# Patient Record
Sex: FEMALE | Race: WHITE | NOT HISPANIC OR LATINO | Employment: FULL TIME | URBAN - METROPOLITAN AREA
[De-identification: names, ages, dates, MRNs, and addresses within clinical notes are randomized per-mention and may not be internally consistent; named-entity substitution may affect disease eponyms.]

---

## 2017-05-22 ENCOUNTER — EXTERNAL ORDER RESULTS (OUTPATIENT)
Dept: HEALTH INFORMATION MANAGEMENT | Facility: CLINIC | Age: 47
End: 2017-05-22

## 2017-05-22 LAB — PAP SMEAR - HIM PATIENT REPORTED: NEGATIVE

## 2017-07-10 ENCOUNTER — HOSPITAL ENCOUNTER (OUTPATIENT)
Dept: MAMMOGRAPHY | Facility: HOSPITAL | Age: 47
Discharge: HOME | End: 2017-07-10
Attending: OBSTETRICS & GYNECOLOGY

## 2017-07-10 ENCOUNTER — TRANSFERRED RECORDS (OUTPATIENT)
Dept: HEALTH INFORMATION MANAGEMENT | Facility: CLINIC | Age: 47
End: 2017-07-10

## 2017-07-10 DIAGNOSIS — Z12.31 VISIT FOR SCREENING MAMMOGRAM: ICD-10-CM

## 2017-07-21 ENCOUNTER — OFFICE VISIT (OUTPATIENT)
Dept: FAMILY MEDICINE | Facility: CLINIC | Age: 47
End: 2017-07-21
Payer: COMMERCIAL

## 2017-07-21 VITALS
TEMPERATURE: 98.2 F | DIASTOLIC BLOOD PRESSURE: 74 MMHG | HEIGHT: 64 IN | SYSTOLIC BLOOD PRESSURE: 128 MMHG | BODY MASS INDEX: 21.75 KG/M2 | WEIGHT: 127.4 LBS | HEART RATE: 69 BPM

## 2017-07-21 DIAGNOSIS — R09.A2 GLOBUS SENSATION: ICD-10-CM

## 2017-07-21 DIAGNOSIS — J04.0 LARYNGITIS: Primary | ICD-10-CM

## 2017-07-21 PROCEDURE — 99214 OFFICE O/P EST MOD 30 MIN: CPT | Performed by: FAMILY MEDICINE

## 2017-07-21 PROCEDURE — 36415 COLL VENOUS BLD VENIPUNCTURE: CPT | Performed by: FAMILY MEDICINE

## 2017-07-21 PROCEDURE — 84443 ASSAY THYROID STIM HORMONE: CPT | Performed by: FAMILY MEDICINE

## 2017-07-21 NOTE — PATIENT INSTRUCTIONS
Ear, nose & throat consult.    TSH (thryoid test) today.    If all normal, will get an ultrasound of neck to make sure no nodules or issues.

## 2017-07-21 NOTE — NURSING NOTE
"Chief Complaint   Patient presents with     Throat Problem       Initial /74  Pulse 69  Temp 98.2  F (36.8  C) (Tympanic)  Ht 5' 4\" (1.626 m)  Wt 127 lb 6.4 oz (57.8 kg)  LMP 07/15/2017 (Approximate)  Breastfeeding? No  BMI 21.87 kg/m2 Estimated body mass index is 21.87 kg/(m^2) as calculated from the following:    Height as of this encounter: 5' 4\" (1.626 m).    Weight as of this encounter: 127 lb 6.4 oz (57.8 kg).  Medication Reconciliation: complete     Corina Cuba CMA      "

## 2017-07-21 NOTE — LETTER
Suzan Toussaint  [address] [facility] [address] [address]        July 27, 2017          Dear ,    We are writing to inform you of your test results.    Your test results fall within the expected range(s) or remain unchanged from previous results.  Your TSH indicates that your thyroid function is currently in balance.  No additional testing is necessary for a year or unless you develop symptoms of over or underactive thyroid.    Resulted Orders   TSH   Result Value Ref Range    TSH 1.02 0.40 - 4.00 mU/L       If you have any questions or concerns, please call the clinic at the number listed above. Thank You.      Sincerely,        Marino Lugo MD

## 2017-07-21 NOTE — MR AVS SNAPSHOT
After Visit Summary   7/21/2017    Suzan Toussaint    MRN: [id number]           Patient Information     Date Of Birth          1970        Visit Information        Provider Department      7/21/2017 2:00 PM Marino Lguo MD [facility]        Today's Diagnoses     Laryngitis    -  1    Globus sensation          Care Instructions      Ear, nose & throat consult.    TSH (thryoid test) today.    If all normal, will get an ultrasound of neck to make sure no nodules or issues.           Follow-ups after your visit        Additional Services     OTOLARYNGOLOGY REFERRAL       Your provider has referred you to: [facility]: [facility] [phone]  [email or web address]    Please be aware that coverage of these services is subject to the terms and limitations of your health insurance plan.  Call member services at your health plan with any benefit or coverage questions.      Please bring the following with you to your appointment:    (1) Any X-Rays, CTs or MRIs which have been performed.  Contact the facility where they were done to arrange for  prior to your scheduled appointment.   (2) List of current medications  (3) This referral request   (4) Any documents/labs given to you for this referral                  Future tests that were ordered for you today     Open Future Orders        Priority Expected Expires Ordered    US Head Neck Soft Tissue Routine  7/21/2018 7/21/2017            Who to contact     Normal or non-critical lab and imaging results will be communicated to you by MyChart, letter or phone within 4 business days after the clinic has received the results. If you do not hear from us within 7 days, please contact the clinic through MyChart or phone. If you have a critical or abnormal lab result, we will notify you by phone as soon as possible.  Submit refill requests through [employer] or call your pharmacy and they will forward  "the refill request to us. Please allow 3 business days for your refill to be completed.          If you need to speak with a  for additional information , please call: [phone]           Additional Information About Your Visit        [employer] Information     [employer] lets you send messages to your doctor, view your test results, renew your prescriptions, schedule appointments and more. To sign up, go to [email or web address] . Click on \"Log in\" on the left side of the screen, which will take you to the Welcome page. Then click on \"Sign up Now\" on the right side of the page.     You will be asked to enter the access code listed below, as well as some personal information. Please follow the directions to create your username and password.     Your access code is: [email or web address]  Expires: 10/19/2017  2:23 PM     Your access code will  in 90 days. If you need help or a new code, please call your [place] clinic or [phone].        Care EveryWhere ID     This is your Care EveryWhere ID. This could be used by other organizations to access your [place] medical records  [id number]        Your Vitals Were     Pulse Temperature Height Last Period Breastfeeding? BMI (Body Mass Index)    69 98.2  F (36.8  C) (Tympanic) 5' 4\" (1.626 m) 07/15/2017 (Approximate) No 21.87 kg/m2       Blood Pressure from Last 3 Encounters:   17 128/74   10/06/14 130/78   10/29/13 110/72    Weight from Last 3 Encounters:   17 127 lb 6.4 oz (57.8 kg)   10/06/14 123 lb (55.8 kg)   10/29/13 122 lb 8 oz (55.6 kg)              We Performed the Following     OTOLARYNGOLOGY REFERRAL     TSH        Primary Care Provider Office Phone # Fax #    Merle Riojas [phone]       [facility] [facility] [address] [place] [address]        Equal Access to Services     GOVIND LEE AH: Hadii layne Ansari, wacarynda lukrista, qasangeetha magana" lareji hall. So [facility] [phone].    ATENCIÓN: Si britneyla jaun, tiene a andersen disposición servicios gratuitos de asistencia lingüística. Allison wilkinson [phone].    We comply with applicable federal civil rights laws and [place] laws. We do not discriminate on the basis of race, color, national origin, age, disability sex, sexual orientation or gender identity.            Thank you!     Thank you for choosing [facility]  for your care. Our goal is always to provide you with excellent care. Hearing back from our patients is one way we can continue to improve our services. Please take a few minutes to complete the written survey that you may receive in the mail after your visit with us. Thank you!             Your Updated Medication List - Protect others around you: Learn how to safely use, store and throw away your medicines at www.disposemymeds.org.          This list is accurate as of: 7/21/17  2:23 PM.  Always use your most recent med list.                   Brand Name Dispense Instructions for use Diagnosis    diazepam 5 MG tablet    VALIUM    20 tablet    Take 1 tablet (5 mg) by mouth every 8 hours as needed for anxiety or sleep    Benign paroxysmal positional vertigo, left       * NO ACTIVE MEDICATIONS      .    Superficial injury of cornea       ondansetron 8 MG ODT tab    ZOFRAN ODT    20 tablet    Take 1 tablet (8 mg) by mouth 3 times daily (before meals)    Benign paroxysmal positional vertigo, left       * order for DME     2 Device    Equipment being ordered: BILAT WRIST BRACE WITH THE THUMB    De Quervain's disease (tenosynovitis)       * Notice:  This list has 2 medication(s) that are the same as other medications prescribed for you. Read the directions carefully, and ask your doctor or other care provider to review them with you.

## 2017-07-21 NOTE — PROGRESS NOTES
"  SUBJECTIVE:                                                    Suzan Toussaint is a 47 year old female who presents to clinic today for the following health issues:      Concern - Throat Problem   Onset: Mid may     Description:   Pt feels like there is lump when she swallows. Not painful. Does not interfere with eating. A lot of throat clearing.     Intensity: mild    Progression of Symptoms:  constant    Accompanying Signs & Symptoms:  None     Previous history of similar problem:   No     Precipitating factors:   Worsened by: No     Alleviating factors:  Improved by: No     Therapies Tried and outcome: Dentist looked in throat could find nothing.       PROBLEMS TO ADD ON...  Occassional vertigo. Has done treatment in past.   Tired a lot.  Doesn't sleep well ever.       Problem list and histories reviewed & adjusted, as indicated.  Additional history: as documented        Reviewed and updated as needed this visit by clinical staffTobacco  Allergies  Meds  Med Hx  Surg Hx  Fam Hx  Soc Hx      Reviewed and updated as needed this visit by Provider         1. Laryngitis    2. Globus sensation        PMH: Updated and/or reviewed in chart.    PSH: Updated and/or reviewed in chart.    Family History: Updated and/or reviewed in chart.     ROS:  Constitutional, neuro, EMT, endocrine, pulmonary, cardiac, gastrointestinal, genitourinary, musculoskeletal, integument and psychiatric systems are otherwise negative.    OBJECTIVE:                                                    /74  Pulse 69  Temp 98.2  F (36.8  C) (Tympanic)  Ht 5' 4\" (1.626 m)  Wt 127 lb 6.4 oz (57.8 kg)  LMP 07/15/2017 (Approximate)  Breastfeeding? No  BMI 21.87 kg/m2  GENERAL: Pleasant and interactive.  Alert and oriented x 3.  No acute distress.  HEENT: Normocephalic, atraumatic. PEERRLA, EOMI.  Scleras, lids and conjunctivae normal. Pinnas, canals and TM's clear.  Nose and oropharynx moist and clear.  NECK: supple and free of " adenopathy or masses, the thyroid is normal without enlargement or nodules.  CHEST:  clear, no wheezing or rales. Normal symmetric air entry throughout both lung fields. No chest wall deformities or tenderness.  HEART:  S1 and S2 normal, no murmurs, clicks, gallops or rubs. Regular rate and rhythm.  ABDOMEN: Soft without tenderness, guarding, mass, rebound or organomegaly. Bowel sounds are normal. No CVA tenderness or inguinal adenopathy noted.    Results for orders placed or performed in visit on 02/04/11   X-ray lt toe(s) G/E 2 views    Impression       TOES LEFT TWO OR MORE VIEWS February 4, 2011 at 1002 hours     HISTORY: A 40-year-old woman with left toe pain.     COMPARISON: None.     FINDINGS: Negative.   LIPID REFLEX TO DIRECT LDL PANEL   Result Value Ref Range    Cholesterol 170 0 - 200 mg/dL    Triglycerides 52 0 - 150 mg/dL    HDL Cholesterol 69 50 - 110 mg/dL    LDL Cholesterol Calculated 90 0 - 129 mg/dL    VLDL-Cholesterol 10 0 - 30 mg/dL    Cholesterol/HDL Ratio 2.0 0.0 - 5.0   Vitamin D deficiency screening   Result Value Ref Range    25 OH Vit D2 <5 ug/L    25 OH Vit D3 44 ug/L    25 OH Vit D total  30 - 75 ug/L     <49  Season, race, dietary intake, and treatment affect the concentration of   25-hydroxy-Vitamin D. Values may decrease during winter months and increase   during summer months. Values less than 30 ug/L may indicate Vitamin D   deficiency.   Comprehensive metabolic panel   Result Value Ref Range    Sodium 138 133 - 144 mmol/L    Potassium 4.4 3.4 - 5.3 mmol/L    Chloride 104 94 - 109 mmol/L    Carbon Dioxide 27 20 - 32 mmol/L    Anion Gap 7 6 - 17 mmol/L    Glucose 83 60 - 99 mg/dL    Urea Nitrogen 17 5 - 24 mg/dL    Creatinine 0.90 0.52 - 1.04 mg/dL    GFR Estimate 70 >60 mL/min/1.7m2    GFR Estimate If Black 84 >60 mL/min/1.7m2    Calcium 9.4 8.5 - 10.4 mg/dL    Bilirubin Total 0.5 0.2 - 1.3 mg/dL    Albumin 4.6 3.9 - 5.1 g/dL    Protein Total 7.6 6.8 - 8.8 g/dL    Alkaline  Phosphatase 39 (L) 40 - 150 U/L    ALT 28 0 - 50 U/L    AST 26 0 - 45 U/L   CBC with platelets   Result Value Ref Range    WBC 4.9 4.0 - 11.0 10e9/L    RBC Count 4.28 3.8 - 5.2 10e12/L    Hemoglobin 13.4 11.7 - 15.7 g/dL    Hematocrit 39.2 35.0 - 47.0 %    MCV 92 78 - 100 fl    MCH 31.3 26.5 - 33.0 pg    MCHC 34.2 31.5 - 36.5 g/dL    RDW 12.3 10.0 - 15.0 %    Platelet Count 180 150 - 450 10e9/L   TSH with free T4 reflex   Result Value Ref Range    TSH 1.55 0.4 - 5.0 mU/L      ASSESSMENT/PLAN:                                                        ICD-10-CM    1. Laryngitis J04.0 TSH     OTOLARYNGOLOGY REFERRAL     US Head Neck Soft Tissue   2. Globus sensation F45.8 TSH     OTOLARYNGOLOGY REFERRAL     US Head Neck Soft Tissue       Care plan updated in chart for chronic problems.    Patient Instructions     Ear, nose & throat consult.    TSH (thryoid test) today.    If all normal, will get an ultrasound of neck to make sure no nodules or issues.      Orders Placed This Encounter     US Head Neck Soft Tissue     TSH     OTOLARYNGOLOGY REFERRAL          See Patient Instructions    Marino Lugo MD

## 2017-07-22 LAB — TSH SERPL DL<=0.05 MIU/L-ACNC: 1.02 MU/L (ref 0.4–4)

## 2017-07-25 ENCOUNTER — OFFICE VISIT (OUTPATIENT)
Dept: OTOLARYNGOLOGY | Facility: CLINIC | Age: 47
End: 2017-07-25
Payer: COMMERCIAL

## 2017-07-25 VITALS — HEIGHT: 64 IN | BODY MASS INDEX: 21.68 KG/M2 | RESPIRATION RATE: 16 BRPM | WEIGHT: 127 LBS

## 2017-07-25 DIAGNOSIS — R09.A2 GLOBUS SENSATION: Primary | ICD-10-CM

## 2017-07-25 DIAGNOSIS — H61.23 BILATERAL IMPACTED CERUMEN: ICD-10-CM

## 2017-07-25 DIAGNOSIS — R49.0 HOARSENESS: ICD-10-CM

## 2017-07-25 PROCEDURE — 69210 REMOVE IMPACTED EAR WAX UNI: CPT | Performed by: OTOLARYNGOLOGY

## 2017-07-25 PROCEDURE — 31575 DIAGNOSTIC LARYNGOSCOPY: CPT | Performed by: OTOLARYNGOLOGY

## 2017-07-25 PROCEDURE — 99204 OFFICE O/P NEW MOD 45 MIN: CPT | Mod: 25 | Performed by: OTOLARYNGOLOGY

## 2017-07-25 ASSESSMENT — PAIN SCALES - GENERAL: PAINLEVEL: NO PAIN (0)

## 2017-07-25 NOTE — Clinical Note
Hi Dr. Lugo,  I didn't see anything on nasopharyngoscopy. I think the thyroid U/S is a good idea. Can you please forward me the results? I'll call her with them.   Phong

## 2017-07-25 NOTE — PATIENT INSTRUCTIONS
General Scheduling Information  To schedule your CT/MRI scan, please contact [place] [facility] at [phone]   [address] [place] [address]    To schedule your Surgery, please contact our Specialty Schedulers at [phone]    ENT Clinic Locations Clinic Hours Telephone Number     [facility]  [address] [place] [address]   Tuesday:       8:00am -- 4:00pm    Wednesday:  8:00am - 4:00pm   To schedule an appointment with   Dr. Fleming,   please contact our   Specialty Scheduling Department at:     [phone]       [facility]  [address] [place] [address]   Friday:          8:00am - 4:00pm         Urgent Care Locations Clinic Hours Telephone Numbers     [facility]  [address] [place] [address]     Monday-Friday:     11:00pm - 9:00pm    Saturday-Sunday:  9:00am - 5:00pm   [phone]     [facility]  [address] [place] [address]     Monday-Friday:      5:00pm - 9:00pm     Saturday-Sunday:  9:00am - 5:00pm   [phone]

## 2017-07-25 NOTE — PROGRESS NOTES
Chief Complaint - foreign body sensation in throat    History of Present Illness - Suzan Toussaint is a 47 year old female who presents with a history of feeling like something is in the back of the throat since a couple months ago. It isn't as bad in the morning. Worse as the day goes on. Talks a lot. She points to the left low anterior neck. Swallowing is when she feels it, but no dysphagia. They also note frequent throat clearing. Voicing has seemed hoarse. The patient denies any recent intubations or throat procedures. They note no history of relux. She tried allergy meds, no help. No hemoptysis, odynaphagia. No dysphagia with solids. Some cough. TSH 1.02.     Past Medical History -   Patient Active Problem List   Diagnosis     CARDIOVASCULAR SCREENING; LDL GOAL LESS THAN 160     Toe pain, left       Current Medications -   Current Outpatient Prescriptions:      diazepam (VALIUM) 5 MG tablet, Take 1 tablet (5 mg) by mouth every 8 hours as needed for anxiety or sleep, Disp: 20 tablet, Rfl: 0     ondansetron (ZOFRAN ODT) 8 MG disintegrating tablet, Take 1 tablet (8 mg) by mouth 3 times daily (before meals), Disp: 20 tablet, Rfl: 0     ORDER FOR DME, Equipment being ordered: BILAT WRIST BRACE WITH THE THUMB, Disp: 2 Device, Rfl: 0     NO ACTIVE MEDICATIONS, ., Disp: , Rfl:     Allergies - No Known Allergies    Social History -   Social History     Social History     Marital status:      Spouse name: N/A     Number of children: N/A     Years of education: N/A     Social History Main Topics     Smoking status: Never Smoker     Smokeless tobacco: Never Used     Alcohol use Yes      Comment: rare     Drug use: No     Sexual activity: Yes     Partners: Male      Comment:  with a vasectomy     Other Topics Concern     None     Social History Narrative       Family History - grandma may have had thyroid cancer.     Review of Systems - As per HPI and PMHx, + vertigo, positional, + ear wax both ears,  "otherwise 10+ comprehensive system review is negative.    Physical Exam  Resp 16  Ht 1.626 m (5' 4\")  Wt 57.6 kg (127 lb)  LMP 07/15/2017 (Approximate)  BMI 21.8 kg/m2  General - The patient is in no distress. Alert and oriented to person and place, answers questions and cooperates with examination appropriately.   Voice and Breathing - The patient was breathing comfortably without the use of accessory muscles. There was no wheezing, stridor, or stertor.  The patients voice was clear and strong.  Eyes - Extraocular movements intact.  Sclera were not icteric or injected, conjunctiva were pink and moist.  Mouth - Examination of the oral cavity showed pink, healthy oral mucosa. No lesions or ulcerations noted.  The tongue was mobile and midline.  Throat - The walls of the oropharynx were smooth, symmetric, and had no lesions or ulcerations.  The tonsillar pillars and soft palate were symmetric.  The uvula was midline on elevation. Tonsils endophytic and quiet. No stones.  Nose - External contour is symmetric, no gross deflection or scars.  Nasal mucosa is pink and moist with no abnormal mucus.  The septum was deviated some to the left, turbinates of normal size and position.  No polyps, masses, or purulence noted on examination.  Neck -  Palpation of the occipital, submental, submandibular, internal jugular chain, and supraclavicular nodes did not demonstrate any abnormal lymph nodes or masses. No parotid masses. Palpation of the thyroid was soft and smooth, with no nodules or goiter appreciated.  The trachea was mobile and midline.  Neurologic - CN II-XII are grossly intact, no focal neurologic deficits.   Cardiovascular - carotid pulses are 2+ bilaterally, regular rhythm    Procedure: Flexible Endoscopy  Indication: Globus Sensation    To best visualize the upper airway anatomy and due to the chief complaint and HPI, I proceeded with a fiberoptic examination. Color photographs were taken for the medical record. " First I sprayed the right side of the nose with a mixture of lidocaine and neosynephrine.  I then passed the scope through the nasal cavity.  The nasal cavity was unremarkable.  The nasopharynx was mucosally covered and symmetric.  The Eustachian tube openings were unobstructed.  Going further down I had a clear view of the base of tongue which had normal appearing lingual tonsillar tissue.  The base of tongue was free of lesions, masses, and the vallecula was open.  The epiglottis was smooth and mucosally covered.  The supraglottic larynx was then clearly visualized. It was normal. The vocal cords moved smoothly and symmetrically, they were pearly white and no lesions were seen.  The pyriform sinuses were open, and the limited view of the postcricoid region did not show any lesions.    Cerumen Removal    Physical Exam and Procedure  Ears - On examination of the ears, I found that both ears were impacted with cerumen.  Therefore, I positioned the patient in the examination chair in a semi-supine position. I used the binocular surgical microscope to perform cerumen removal.  On the right side, I began by using a cerumen loop to gently lift the edges of the cerumen mass away from the walls of the external canal.  Once I did this, I was able to pull with an alligator and suction away fragments of wax and debris. I removed all the wax and debri.  The tympanic membrane was intact, no sign of perforation or middle ear effusion.    I turned my attention to the left side once again using the binocular surgical microscope to perform cerumen removal.  I began by using a cerumen loop to gently lift the edges of the cerumen mass away from the walls of the external canal.  Once I did this, I was able to pull and then also suction away fragments of wax and debris. I removed all the wax and debri. I had to pull out larger pieces with an alligator. The tympanic membrane was intact, no sign of perforation or middle ear  effusion.          A/P - Suzan Toussaint is a 47 year old female with globus sensation but no evidence of infection, inflammation, or neoplasm on exam to explain the symptoms. It is focal, left side. I don't feel anything, but I do think a thyroid U/S is reasonable given she points to the left lobe. This could also be globus from reflux, allergies, postnasal drainage. She can try flonase, and pay closer attention to reflux. I gave her a handout on some diet and lifestyle changes. No eating before lying down. Recheck in 4-6 weeks.         Dr. Jaret Fleming  Otolaryngology  [facility]

## 2017-07-25 NOTE — MR AVS SNAPSHOT
After Visit Summary   7/25/2017    Suzan Toussaint    MRN: [id number]           Patient Information     Date Of Birth          1970        Visit Information        Provider Department      7/25/2017 8:15 AM Jaret Fleming MD [facility]        Today's Diagnoses     Globus sensation    -  1    Hoarseness        Bilateral impacted cerumen          Care Instructions    General Scheduling Information  To schedule your CT/MRI scan, please contact [place] [facility] at [phone] [address] [place] [address]    To schedule your Surgery, please contact our Specialty Schedulers at [phone]    ENT Clinic Locations Clinic Hours Telephone Number     [facility]  [address]  [place] [address]   Tuesday:       8:00am -- 4:00pm    Wednesday:  8:00am - 4:00pm   To schedule an appointment with   Dr. Fleming,   please contact our   Specialty Scheduling Department at:     [phone]       [facility]  [address] [place] [address]   Friday:          8:00am - 4:00pm         Urgent Care Locations Clinic Hours Telephone Numbers     [facility]  [address] [place] [address]     Monday-Friday:     11:00pm - 9:00pm    Saturday-Sunday:  9:00am - 5:00pm   [phone]     [facility]  [address] [place] [address]     Monday-Friday:      5:00pm - 9:00pm     Saturday-Sunday:  9:00am - 5:00pm   [phone]               Follow-ups after your visit        Who to contact     If you have questions or need follow up information about today's clinic visit or your schedule please contact [facility] directly at [phone].  Normal or non-critical lab and imaging results will be communicated to you by MyChart, letter or phone within 4 business days after the clinic has received the results. If you do not hear from us within 7 days, please contact the clinic through MyChart or phone. If you have a critical  "or abnormal lab result, we will notify you by phone as soon as possible.  Submit refill requests through [employer] or call your pharmacy and they will forward the refill request to us. Please allow 3 business days for your refill to be completed.          Additional Information About Your Visit        [employer] Information     [employer] lets you send messages to your doctor, view your test results, renew your prescriptions, schedule appointments and more. To sign up, go to [email or web address] . Click on \"Log in\" on the left side of the screen, which will take you to the Welcome page. Then click on \"Sign up Now\" on the right side of the page.     You will be asked to enter the access code listed below, as well as some personal information. Please follow the directions to create your username and password.     Your access code is: [email or web address]  Expires: 10/19/2017  2:23 PM     Your access code will  in 90 days. If you need help or a new code, please call your [facility] clinic or [phone].        Care EveryWhere ID     This is your Care EveryWhere ID. This could be used by other organizations to access your [facility] medical records  [id number]        Your Vitals Were     Respirations Height Last Period BMI (Body Mass Index)          16 1.626 m (5' 4\") 07/15/2017 (Approximate) 21.8 kg/m2         Blood Pressure from Last 3 Encounters:   17 128/74   10/06/14 130/78   10/29/13 110/72    Weight from Last 3 Encounters:   17 57.6 kg (127 lb)   17 57.8 kg (127 lb 6.4 oz)   10/06/14 55.8 kg (123 lb)              We Performed the Following     Laryngoscopy, Fiber     Remove Cerumen        Primary Care Provider Office Phone # Fax #    Merle Riojas [phone]       [place] [facility] [address] [place] [address]        Equal Access to Services     MILAGROS LEE AH: Belinda Ansari, jeremy calle, qasayda obrien, sangeetha jenkins" lareji hall. So [facility] [phone].    ATENCIÓN: Si rianna zamorano, tiene a andersen disposición servicios gratuitos de asistencia lingüística. Allison wilkinson [phone].    We comply with applicable federal civil rights laws and [place] laws. We do not discriminate on the basis of race, color, national origin, age, disability sex, sexual orientation or gender identity.            Thank you!     Thank you for choosing [facility]  for your care. Our goal is always to provide you with excellent care. Hearing back from our patients is one way we can continue to improve our services. Please take a few minutes to complete the written survey that you may receive in the mail after your visit with us. Thank you!             Your Updated Medication List - Protect others around you: Learn how to safely use, store and throw away your medicines at www.disposemymeds.org.          This list is accurate as of: 7/25/17  9:56 AM.  Always use your most recent med list.                   Brand Name Dispense Instructions for use Diagnosis    diazepam 5 MG tablet    VALIUM    20 tablet    Take 1 tablet (5 mg) by mouth every 8 hours as needed for anxiety or sleep    Benign paroxysmal positional vertigo, left       * NO ACTIVE MEDICATIONS      .    Superficial injury of cornea       ondansetron 8 MG ODT tab    ZOFRAN ODT    20 tablet    Take 1 tablet (8 mg) by mouth 3 times daily (before meals)    Benign paroxysmal positional vertigo, left       * order for DME     2 Device    Equipment being ordered: BILAT WRIST BRACE WITH THE THUMB    De Quervain's disease (tenosynovitis)       * Notice:  This list has 2 medication(s) that are the same as other medications prescribed for you. Read the directions carefully, and ask your doctor or other care provider to review them with you.

## 2017-07-25 NOTE — NURSING NOTE
"Chief Complaint   Patient presents with     Throat Problem     Globus sensation       Initial Resp 16  Ht 1.626 m (5' 4\")  Wt 57.6 kg (127 lb)  LMP 07/15/2017 (Approximate)  BMI 21.8 kg/m2 Estimated body mass index is 21.8 kg/(m^2) as calculated from the following:    Height as of this encounter: 1.626 m (5' 4\").    Weight as of this encounter: 57.6 kg (127 lb).  Medication Reconciliation: complete     Latoya Wang MA    "

## 2017-07-26 NOTE — PROGRESS NOTES
Please send a letter with results and include  the following message:    Ms. Toussaint,    Your TSH indicates that your thyroid function is currently in balance.  No additional testing is necessary for a year or unless you develop symptoms of over or underactive thyroid.     Please contact the clinic if you have additional questions.  Thank you.    Sincerely,    Darrick Lugo MD

## 2017-07-26 NOTE — PROGRESS NOTES
Please abstract the following data from this visit with this patient into the appropriate field in Epic:    Mammogram done on this date: 7/10/2017 (approximately), by this group: [facility] , results were Findings: The breast are hetergeneously dense, which may obscure small masses.There is no radiographic evidence of malignancy. This study was evaluated with the assistance of computer- Aided detection. Continue routine screening mammogram as recommended   Pap smear/ HPV reflex done on this date: 5/22/2017 (approximately), by this group: [facility] , results were NILM .     Corina Cuba MA

## 2017-07-27 ENCOUNTER — RADIANT APPOINTMENT (OUTPATIENT)
Dept: ULTRASOUND IMAGING | Facility: CLINIC | Age: 47
End: 2017-07-27
Attending: FAMILY MEDICINE
Payer: COMMERCIAL

## 2017-07-27 DIAGNOSIS — J04.0 LARYNGITIS: ICD-10-CM

## 2017-07-27 DIAGNOSIS — R09.A2 GLOBUS SENSATION: ICD-10-CM

## 2017-07-27 PROCEDURE — 76536 US EXAM OF HEAD AND NECK: CPT

## 2017-07-31 NOTE — PROGRESS NOTES
Please send a letter with results and include  the following message:    Ms. Toussaint,    No masses or other abnormalities were noted on your ultrasound.     Please contact the clinic if you have additional questions.  Thank you.    Sincerely,    Darrick Lugo MD

## 2017-12-21 ENCOUNTER — TELEPHONE (OUTPATIENT)
Dept: FAMILY MEDICINE | Facility: CLINIC | Age: 47
End: 2017-12-21

## 2017-12-21 NOTE — TELEPHONE ENCOUNTER
"I spoke wit Suzan. She said that she started spotting and this is about the time her menstrual cycle should start. The difference this time is that this period is very painful. States, \"It almost feels like a miscarriage which I have had in the past, but I couldn't be pregnant because my   had a vasectomy 12 years ago.\"    Patient says the pain seems to be less when she stands. When she sits it becomes much more painful. Pain is located in pelvic area and is not related to GI issues per patient. It has been going on for 24 hours and ibuprofen has not resolved it.She does not have a fever but has gotten sweats and chills.    Recommended visit in ED due to the possible need for ultrasound. The patient said she would contact her OB/GYN and see if she could get in there.Otherwise she said she has an appointment with [facility] tomorrow. I did let her know we did not have ultrasound available here but that imaging could be ordered.    If pain worsens or she develops new symptoms she should be evaluated in the emergency room. Jayne Urrutia RN    "

## 2019-06-21 ENCOUNTER — HOSPITAL ENCOUNTER (OUTPATIENT)
Dept: MAMMOGRAPHY | Facility: CLINIC | Age: 49
Discharge: HOME | End: 2019-06-21
Attending: OBSTETRICS & GYNECOLOGY

## 2019-06-21 DIAGNOSIS — Z12.31 VISIT FOR SCREENING MAMMOGRAM: ICD-10-CM

## 2021-05-25 ENCOUNTER — RECORDS - HEALTHEAST (OUTPATIENT)
Dept: ADMINISTRATIVE | Facility: CLINIC | Age: 51
End: 2021-05-25

## 2021-05-29 ENCOUNTER — RECORDS - HEALTHEAST (OUTPATIENT)
Dept: ADMINISTRATIVE | Facility: CLINIC | Age: 51
End: 2021-05-29

## 2021-07-21 ENCOUNTER — RECORDS - HEALTHEAST (OUTPATIENT)
Dept: ADMINISTRATIVE | Facility: CLINIC | Age: 51
End: 2021-07-21

## 2021-10-07 ENCOUNTER — THERAPY VISIT (OUTPATIENT)
Dept: OCCUPATIONAL THERAPY | Facility: CLINIC | Age: 51
End: 2021-10-07
Payer: COMMERCIAL

## 2021-10-07 DIAGNOSIS — M25.521 RIGHT ELBOW PAIN: Primary | ICD-10-CM

## 2021-10-07 PROCEDURE — 97112 NEUROMUSCULAR REEDUCATION: CPT | Mod: GO | Performed by: OCCUPATIONAL THERAPIST

## 2021-10-07 PROCEDURE — 97110 THERAPEUTIC EXERCISES: CPT | Mod: GO | Performed by: OCCUPATIONAL THERAPIST

## 2021-10-07 PROCEDURE — 97165 OT EVAL LOW COMPLEX 30 MIN: CPT | Mod: GO | Performed by: OCCUPATIONAL THERAPIST

## 2021-10-07 NOTE — PROGRESS NOTES
Hand Therapy Initial Evaluation     Current Date: 10/7/2021    Diagnosis: Right elbow pain   DOI: May 2021    Subjective:  Patient Health History  Suzan Toussaint being seen for Tennis elbow.     Problem began: 5/20/2021.   Problem occurred: Tennis     General health as reported by patient is excellent.  Pertinent medical history includes: none.   Red flags:  None as reported by patient.  Medical allergies: none.   Surgeries include:  None.    Current medications:  None.    Current occupation is School psychologist.   Primary job tasks include:  Computer work and other.   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                Occupational Profile Information:  Right hand dominant  Prior functional level:  independent-shared household chores  Patient reports symptoms of pain, stiffness/loss of motion and weakness/loss of strength  Special tests:  none.    Previous treatment: self treatment with massage, NSAID's,icing, arm band and elbow sleeve  Barriers include:none  Mobility: No difficulty  Transportation: drives  Currently working in normal job without restrictions  Leisure activities/hobbies: tennis    Functional Outcome Measure:  Upper Extremity Functional Index  SCORE:   Column Totals: 60/80  (A lower score indicates greater disability.)    Objective:  Pain Level (Scale 0-10)   10/7/2021   At Rest 0   With Use 3-4     Pain Description  Date 10/7/2021   Location Lateral elbow and forearm   Pain Quality Sharp   Frequency intermittent     Pain is worst  daytime   Exacerbated by  reaching, lifting, twisting, tennis   Relieved by cold, NSAIDs and rest   Progression Improvied     Posture  Slightly Rounded Forward Shoulders    Sensation  WNL throughout all nerve distributions; per patient report    ROM  WNL's elbow, forearm and wrist, slight pain with elbow E/F and wrist Ext    Strength   (Measured in pounds)  Pain Report: - none  + mild    ++ moderate    +++ severe    10/7/2021 10/7/2021   Trials Left Right    1  2  3 68  58  54 41+  46+  38+   Average 60 42       10/7/2021 10/7/2021    Left Right   Elbow Ext 62 32++     Resisted Testing  Pain Report:  - none    + mild    ++ moderate    +++ severe    10/7/2021   Supination  + slightt    Pronation -   Wrist Ext with RD, Elbow Ext ++   Wrist Ext with UD, Elbow Ext + slight   Wrist Flex with RD, Elbow Ext -   Wrist Flex with UD, Elbow Ext -   EDC with Elbow Ext -   Long Finger Test + slight   Pain Report:  - none    + mild    ++ moderate    +++ severe    10/7/2021   Pec Major -   Proximal Triceps -   Spiral Groove -   Distal Triceps -   Anconeus -   ECRB at LEP +   ECU at LEP -   EDC at LEP ++   Radial Head -   Extensor Wad + tightness   PIN Site + slight     Assessment:  Patient presents with symptoms consistent with diagnosis of right elbow elbow pain/LEP, with conservative intervention.     Patient's limitations or Problem List includes:  Pain, Weakness, Decreased  and Tightness in musculature of the right elbow which interferes with the patient's ability to perform Work Tasks, Sleep Patterns, Recreational Activities and Household Chores as compared to previous level of function.    Rehab Potential:  Excellent - Return to full activity, no limitations    Patient will benefit from skilled Occupational Therapy to increase flexibility, overall strength and  strength and decrease pain to return to previous activity level and resume normal daily tasks and to reach their rehab potential.    Barriers to Learning:  No barrier    Communication Issues:  Patient appears to be able to clearly communicate and understand verbal and written communication and follow directions correctly.    Chart Review: Chart Review and Simple history review with patient    Identified Performance Deficits: home establishment and management, meal preparation and cleanup, sleep, work and leisure activities    Assessment of Occupational Performance:  5 or more Performance Deficits    Clinical  Decision Making (Complexity): Low complexity    Treatment Explanation:  The following has been discussed with the patient:  RX ordered/plan of care  Anticipated outcomes  Possible risks and side effects    Plan:  Frequency:  1 X week, once daily  Duration:  for 6 weeks    Treatment Plan:    Modalities:    US   Therapeutic Exercise:    AROM, PROM and Isotonics  Neuromuscular re-ed:   Posture and Kinesiotaping  Manual Techniques:   Friction massage and Myofascial release  Orthotic Fabrication:    Static Forearm based  Self Care:    Self Care Tasks and Ergonomic Considerations    Discharge Plan:  Achieve all LTG.  Independent in home treatment program.  Reach maximal therapeutic benefit.    Home Program:   Warmth for stiffness to forearm extensors  Ice to lateral elbow after activity for pain  TFM to LEP  MFR to forearm extensors with tennis ball, avoid PIN site  PROM with stretch to forearm extensors and flexors  Eccentric wrist extension strengthening  3 position  strengthening, begin with elbow at side  Trial Kinesiotaping to LEP   Elbow strap/arm band as needed with activities, monitor for PIN site irritation  Wear elbow sleeve or OTC wrist splint sleeping   Avoid activities that exacerbate pain in the elbow  Lift with elbows at sides and palms up    Next Visit:  See in 2 weeks  Assess response to HEP and kinesiotaping   Consider US to LEP if continued tenderness  Consider Wrist cock up orthosis sleeping if no change  Progress to pec stretches and postural exercises

## 2021-11-13 ENCOUNTER — HEALTH MAINTENANCE LETTER (OUTPATIENT)
Age: 51
End: 2021-11-13

## 2021-12-07 PROBLEM — M25.521 RIGHT ELBOW PAIN: Status: RESOLVED | Noted: 2021-10-07 | Resolved: 2021-12-07

## 2022-11-20 ENCOUNTER — HEALTH MAINTENANCE LETTER (OUTPATIENT)
Age: 52
End: 2022-11-20

## 2023-11-25 ENCOUNTER — HEALTH MAINTENANCE LETTER (OUTPATIENT)
Age: 53
End: 2023-11-25

## 2024-07-29 ENCOUNTER — LAB REQUISITION (OUTPATIENT)
Dept: LAB | Facility: CLINIC | Age: 54
End: 2024-07-29

## 2024-07-29 DIAGNOSIS — Z13.29 ENCOUNTER FOR SCREENING FOR OTHER SUSPECTED ENDOCRINE DISORDER: ICD-10-CM

## 2024-07-29 DIAGNOSIS — Z13.1 ENCOUNTER FOR SCREENING FOR DIABETES MELLITUS: ICD-10-CM

## 2024-07-29 DIAGNOSIS — Z13.220 ENCOUNTER FOR SCREENING FOR LIPOID DISORDERS: ICD-10-CM

## 2024-07-29 LAB — HBA1C MFR BLD: 5.8 %

## 2024-07-29 PROCEDURE — 83036 HEMOGLOBIN GLYCOSYLATED A1C: CPT | Performed by: NURSE PRACTITIONER

## 2024-07-29 PROCEDURE — 84443 ASSAY THYROID STIM HORMONE: CPT | Performed by: NURSE PRACTITIONER

## 2024-07-29 PROCEDURE — 80061 LIPID PANEL: CPT | Performed by: NURSE PRACTITIONER

## 2024-07-30 LAB
CHOLEST SERPL-MCNC: 223 MG/DL
FASTING STATUS PATIENT QL REPORTED: NO
HDLC SERPL-MCNC: 81 MG/DL
LDLC SERPL CALC-MCNC: 115 MG/DL
NONHDLC SERPL-MCNC: 142 MG/DL
TRIGL SERPL-MCNC: 135 MG/DL
TSH SERPL DL<=0.005 MIU/L-ACNC: 1.35 UIU/ML (ref 0.3–4.2)

## 2025-06-21 ENCOUNTER — HEALTH MAINTENANCE LETTER (OUTPATIENT)
Age: 55
End: 2025-06-21